# Patient Record
Sex: FEMALE | URBAN - METROPOLITAN AREA
[De-identification: names, ages, dates, MRNs, and addresses within clinical notes are randomized per-mention and may not be internally consistent; named-entity substitution may affect disease eponyms.]

---

## 2020-04-19 ENCOUNTER — INPATIENT (INPATIENT)
Facility: HOSPITAL | Age: 85
LOS: 2 days | Discharge: HOSPICE | DRG: 871 | End: 2020-04-22
Attending: FAMILY MEDICINE | Admitting: FAMILY MEDICINE
Payer: MEDICARE

## 2020-04-19 VITALS — HEIGHT: 60 IN | WEIGHT: 104.94 LBS

## 2020-04-19 DIAGNOSIS — K92.2 GASTROINTESTINAL HEMORRHAGE, UNSPECIFIED: ICD-10-CM

## 2020-04-19 LAB
ALBUMIN SERPL ELPH-MCNC: 2.1 G/DL — LOW (ref 3.3–5)
ALP SERPL-CCNC: 141 U/L — HIGH (ref 40–120)
ALT FLD-CCNC: 34 U/L — SIGNIFICANT CHANGE UP (ref 12–78)
ANION GAP SERPL CALC-SCNC: 12 MMOL/L — SIGNIFICANT CHANGE UP (ref 5–17)
APTT BLD: 30.6 SEC — SIGNIFICANT CHANGE UP (ref 27.5–36.3)
AST SERPL-CCNC: 29 U/L — SIGNIFICANT CHANGE UP (ref 15–37)
BASE EXCESS BLDV CALC-SCNC: -8 MMOL/L — LOW (ref -2–2)
BASOPHILS # BLD AUTO: 0 K/UL — SIGNIFICANT CHANGE UP (ref 0–0.2)
BASOPHILS NFR BLD AUTO: 0 % — SIGNIFICANT CHANGE UP (ref 0–2)
BILIRUB SERPL-MCNC: 1.1 MG/DL — SIGNIFICANT CHANGE UP (ref 0.2–1.2)
BUN SERPL-MCNC: 112 MG/DL — HIGH (ref 7–23)
CALCIUM SERPL-MCNC: 10.2 MG/DL — HIGH (ref 8.5–10.1)
CHLORIDE SERPL-SCNC: 134 MMOL/L — HIGH (ref 96–108)
CO2 SERPL-SCNC: 19 MMOL/L — LOW (ref 22–31)
CREAT SERPL-MCNC: 5.6 MG/DL — HIGH (ref 0.5–1.3)
EOSINOPHIL # BLD AUTO: 0 K/UL — SIGNIFICANT CHANGE UP (ref 0–0.5)
EOSINOPHIL NFR BLD AUTO: 0 % — SIGNIFICANT CHANGE UP (ref 0–6)
FIBRINOGEN PPP-MCNC: >1400 MG/DL — HIGH (ref 320–520)
GLUCOSE SERPL-MCNC: 215 MG/DL — HIGH (ref 70–99)
HCO3 BLDV-SCNC: 18 MMOL/L — LOW (ref 21–29)
HCT VFR BLD CALC: 30.9 % — LOW (ref 34.5–45)
HGB BLD-MCNC: 9.3 G/DL — LOW (ref 11.5–15.5)
INR BLD: 1.39 RATIO — HIGH (ref 0.88–1.16)
LACTATE SERPL-SCNC: 2.8 MMOL/L — HIGH (ref 0.7–2)
LDH SERPL L TO P-CCNC: 366 U/L — HIGH (ref 84–241)
LYMPHOCYTES # BLD AUTO: 1.78 K/UL — SIGNIFICANT CHANGE UP (ref 1–3.3)
LYMPHOCYTES # BLD AUTO: 12 % — LOW (ref 13–44)
MCHC RBC-ENTMCNC: 29.3 PG — SIGNIFICANT CHANGE UP (ref 27–34)
MCHC RBC-ENTMCNC: 30.1 GM/DL — LOW (ref 32–36)
MCV RBC AUTO: 97.5 FL — SIGNIFICANT CHANGE UP (ref 80–100)
MONOCYTES # BLD AUTO: 0.59 K/UL — SIGNIFICANT CHANGE UP (ref 0–0.9)
MONOCYTES NFR BLD AUTO: 4 % — SIGNIFICANT CHANGE UP (ref 2–14)
NEUTROPHILS # BLD AUTO: 12.14 K/UL — HIGH (ref 1.8–7.4)
NEUTROPHILS NFR BLD AUTO: 82 % — HIGH (ref 43–77)
NRBC # BLD: SIGNIFICANT CHANGE UP /100 WBCS (ref 0–0)
PCO2 BLDV: 42 MMHG — SIGNIFICANT CHANGE UP (ref 35–50)
PH BLDV: 7.26 — LOW (ref 7.35–7.45)
PLATELET # BLD AUTO: 260 K/UL — SIGNIFICANT CHANGE UP (ref 150–400)
PO2 BLDV: 42 MMHG — SIGNIFICANT CHANGE UP (ref 25–45)
POTASSIUM SERPL-MCNC: 5 MMOL/L — SIGNIFICANT CHANGE UP (ref 3.5–5.3)
POTASSIUM SERPL-SCNC: 5 MMOL/L — SIGNIFICANT CHANGE UP (ref 3.5–5.3)
PROT SERPL-MCNC: 8.8 GM/DL — HIGH (ref 6–8.3)
PROTHROM AB SERPL-ACNC: 15.6 SEC — HIGH (ref 10–12.9)
RBC # BLD: 3.17 M/UL — LOW (ref 3.8–5.2)
RBC # FLD: 13.8 % — SIGNIFICANT CHANGE UP (ref 10.3–14.5)
SAO2 % BLDV: 59 % — LOW (ref 67–88)
SODIUM SERPL-SCNC: 165 MMOL/L — CRITICAL HIGH (ref 135–145)
WBC # BLD: 14.8 K/UL — HIGH (ref 3.8–10.5)
WBC # FLD AUTO: 14.8 K/UL — HIGH (ref 3.8–10.5)

## 2020-04-19 PROCEDURE — 85730 THROMBOPLASTIN TIME PARTIAL: CPT

## 2020-04-19 PROCEDURE — 74176 CT ABD & PELVIS W/O CONTRAST: CPT | Mod: 26

## 2020-04-19 PROCEDURE — 36415 COLL VENOUS BLD VENIPUNCTURE: CPT

## 2020-04-19 PROCEDURE — 86140 C-REACTIVE PROTEIN: CPT

## 2020-04-19 PROCEDURE — 93010 ELECTROCARDIOGRAM REPORT: CPT

## 2020-04-19 PROCEDURE — 99223 1ST HOSP IP/OBS HIGH 75: CPT

## 2020-04-19 PROCEDURE — 82784 ASSAY IGA/IGD/IGG/IGM EACH: CPT

## 2020-04-19 PROCEDURE — 84100 ASSAY OF PHOSPHORUS: CPT

## 2020-04-19 PROCEDURE — 83735 ASSAY OF MAGNESIUM: CPT

## 2020-04-19 PROCEDURE — 83605 ASSAY OF LACTIC ACID: CPT

## 2020-04-19 PROCEDURE — 84478 ASSAY OF TRIGLYCERIDES: CPT

## 2020-04-19 PROCEDURE — 71045 X-RAY EXAM CHEST 1 VIEW: CPT | Mod: 26

## 2020-04-19 PROCEDURE — 85027 COMPLETE CBC AUTOMATED: CPT

## 2020-04-19 PROCEDURE — 83520 IMMUNOASSAY QUANT NOS NONAB: CPT

## 2020-04-19 PROCEDURE — 82728 ASSAY OF FERRITIN: CPT

## 2020-04-19 PROCEDURE — 85384 FIBRINOGEN ACTIVITY: CPT

## 2020-04-19 PROCEDURE — 83615 LACTATE (LD) (LDH) ENZYME: CPT

## 2020-04-19 PROCEDURE — 80048 BASIC METABOLIC PNL TOTAL CA: CPT

## 2020-04-19 RX ORDER — ONDANSETRON 8 MG/1
4 TABLET, FILM COATED ORAL ONCE
Refills: 0 | Status: COMPLETED | OUTPATIENT
Start: 2020-04-19 | End: 2020-04-19

## 2020-04-19 RX ORDER — ACETAMINOPHEN 500 MG
1000 TABLET ORAL ONCE
Refills: 0 | Status: COMPLETED | OUTPATIENT
Start: 2020-04-19 | End: 2020-04-19

## 2020-04-19 RX ORDER — GABAPENTIN 400 MG/1
300 CAPSULE ORAL THREE TIMES A DAY
Refills: 0 | Status: DISCONTINUED | OUTPATIENT
Start: 2020-04-19 | End: 2020-04-21

## 2020-04-19 RX ORDER — ACETAMINOPHEN 500 MG
650 TABLET ORAL EVERY 4 HOURS
Refills: 0 | Status: DISCONTINUED | OUTPATIENT
Start: 2020-04-19 | End: 2020-04-21

## 2020-04-19 RX ORDER — MEMANTINE HYDROCHLORIDE 10 MG/1
10 TABLET ORAL
Refills: 0 | Status: DISCONTINUED | OUTPATIENT
Start: 2020-04-19 | End: 2020-04-21

## 2020-04-19 RX ORDER — GUAIFENESIN/DEXTROMETHORPHAN 600MG-30MG
10 TABLET, EXTENDED RELEASE 12 HR ORAL EVERY 4 HOURS
Refills: 0 | Status: DISCONTINUED | OUTPATIENT
Start: 2020-04-19 | End: 2020-04-21

## 2020-04-19 RX ORDER — ENOXAPARIN SODIUM 100 MG/ML
40 INJECTION SUBCUTANEOUS DAILY
Refills: 0 | Status: DISCONTINUED | OUTPATIENT
Start: 2020-04-19 | End: 2020-04-19

## 2020-04-19 RX ORDER — OXYCODONE HYDROCHLORIDE 5 MG/1
5 TABLET ORAL EVERY 6 HOURS
Refills: 0 | Status: DISCONTINUED | OUTPATIENT
Start: 2020-04-19 | End: 2020-04-21

## 2020-04-19 RX ORDER — DONEPEZIL HYDROCHLORIDE 10 MG/1
10 TABLET, FILM COATED ORAL AT BEDTIME
Refills: 0 | Status: DISCONTINUED | OUTPATIENT
Start: 2020-04-19 | End: 2020-04-21

## 2020-04-19 RX ORDER — HYDRALAZINE HCL 50 MG
25 TABLET ORAL
Refills: 0 | Status: DISCONTINUED | OUTPATIENT
Start: 2020-04-19 | End: 2020-04-21

## 2020-04-19 RX ORDER — MORPHINE SULFATE 50 MG/1
1 CAPSULE, EXTENDED RELEASE ORAL
Refills: 0 | Status: DISCONTINUED | OUTPATIENT
Start: 2020-04-19 | End: 2020-04-21

## 2020-04-19 RX ORDER — ONDANSETRON 8 MG/1
4 TABLET, FILM COATED ORAL EVERY 6 HOURS
Refills: 0 | Status: DISCONTINUED | OUTPATIENT
Start: 2020-04-19 | End: 2020-04-22

## 2020-04-19 RX ORDER — ASCORBIC ACID 60 MG
500 TABLET,CHEWABLE ORAL DAILY
Refills: 0 | Status: DISCONTINUED | OUTPATIENT
Start: 2020-04-19 | End: 2020-04-21

## 2020-04-19 RX ORDER — ZINC SULFATE TAB 220 MG (50 MG ZINC EQUIVALENT) 220 (50 ZN) MG
220 TAB ORAL AT BEDTIME
Refills: 0 | Status: DISCONTINUED | OUTPATIENT
Start: 2020-04-19 | End: 2020-04-19

## 2020-04-19 RX ORDER — SODIUM CHLORIDE 9 MG/ML
1000 INJECTION, SOLUTION INTRAVENOUS
Refills: 0 | Status: DISCONTINUED | OUTPATIENT
Start: 2020-04-19 | End: 2020-04-20

## 2020-04-19 RX ORDER — HYDROXYCHLOROQUINE SULFATE 200 MG
400 TABLET ORAL EVERY 24 HOURS
Refills: 0 | Status: DISCONTINUED | OUTPATIENT
Start: 2020-04-20 | End: 2020-04-21

## 2020-04-19 RX ORDER — HEPARIN SODIUM 5000 [USP'U]/ML
5000 INJECTION INTRAVENOUS; SUBCUTANEOUS EVERY 12 HOURS
Refills: 0 | Status: DISCONTINUED | OUTPATIENT
Start: 2020-04-19 | End: 2020-04-19

## 2020-04-19 RX ORDER — FOLIC ACID 0.8 MG
1 TABLET ORAL DAILY
Refills: 0 | Status: DISCONTINUED | OUTPATIENT
Start: 2020-04-19 | End: 2020-04-21

## 2020-04-19 RX ORDER — PIPERACILLIN AND TAZOBACTAM 4; .5 G/20ML; G/20ML
3.38 INJECTION, POWDER, LYOPHILIZED, FOR SOLUTION INTRAVENOUS EVERY 12 HOURS
Refills: 0 | Status: DISCONTINUED | OUTPATIENT
Start: 2020-04-20 | End: 2020-04-21

## 2020-04-19 RX ORDER — HYDROMORPHONE HYDROCHLORIDE 2 MG/ML
0.5 INJECTION INTRAMUSCULAR; INTRAVENOUS; SUBCUTANEOUS ONCE
Refills: 0 | Status: DISCONTINUED | OUTPATIENT
Start: 2020-04-19 | End: 2020-04-19

## 2020-04-19 RX ORDER — PIPERACILLIN AND TAZOBACTAM 4; .5 G/20ML; G/20ML
3.38 INJECTION, POWDER, LYOPHILIZED, FOR SOLUTION INTRAVENOUS ONCE
Refills: 0 | Status: COMPLETED | OUTPATIENT
Start: 2020-04-19 | End: 2020-04-19

## 2020-04-19 RX ORDER — SODIUM CHLORIDE 9 MG/ML
500 INJECTION INTRAMUSCULAR; INTRAVENOUS; SUBCUTANEOUS ONCE
Refills: 0 | Status: COMPLETED | OUTPATIENT
Start: 2020-04-19 | End: 2020-04-19

## 2020-04-19 RX ORDER — PANTOPRAZOLE SODIUM 20 MG/1
80 TABLET, DELAYED RELEASE ORAL ONCE
Refills: 0 | Status: COMPLETED | OUTPATIENT
Start: 2020-04-19 | End: 2020-04-19

## 2020-04-19 RX ORDER — DULOXETINE HYDROCHLORIDE 30 MG/1
60 CAPSULE, DELAYED RELEASE ORAL AT BEDTIME
Refills: 0 | Status: DISCONTINUED | OUTPATIENT
Start: 2020-04-19 | End: 2020-04-21

## 2020-04-19 RX ORDER — ROBINUL 0.2 MG/ML
1 INJECTION INTRAMUSCULAR; INTRAVENOUS ONCE
Refills: 0 | Status: COMPLETED | OUTPATIENT
Start: 2020-04-19 | End: 2020-04-19

## 2020-04-19 RX ORDER — HYDROXYCHLOROQUINE SULFATE 200 MG
800 TABLET ORAL EVERY 24 HOURS
Refills: 0 | Status: COMPLETED | OUTPATIENT
Start: 2020-04-19 | End: 2020-04-19

## 2020-04-19 RX ORDER — AMLODIPINE BESYLATE 2.5 MG/1
10 TABLET ORAL DAILY
Refills: 0 | Status: DISCONTINUED | OUTPATIENT
Start: 2020-04-19 | End: 2020-04-21

## 2020-04-19 RX ORDER — DULOXETINE HYDROCHLORIDE 30 MG/1
30 CAPSULE, DELAYED RELEASE ORAL DAILY
Refills: 0 | Status: DISCONTINUED | OUTPATIENT
Start: 2020-04-19 | End: 2020-04-21

## 2020-04-19 RX ORDER — HYDROXYCHLOROQUINE SULFATE 200 MG
TABLET ORAL
Refills: 0 | Status: DISCONTINUED | OUTPATIENT
Start: 2020-04-19 | End: 2020-04-21

## 2020-04-19 RX ADMIN — PIPERACILLIN AND TAZOBACTAM 200 GRAM(S): 4; .5 INJECTION, POWDER, LYOPHILIZED, FOR SOLUTION INTRAVENOUS at 15:32

## 2020-04-19 RX ADMIN — Medication 1000 MILLIGRAM(S): at 16:00

## 2020-04-19 RX ADMIN — PANTOPRAZOLE SODIUM 80 MILLIGRAM(S): 20 TABLET, DELAYED RELEASE ORAL at 15:32

## 2020-04-19 RX ADMIN — SODIUM CHLORIDE 500 MILLILITER(S): 9 INJECTION INTRAMUSCULAR; INTRAVENOUS; SUBCUTANEOUS at 16:51

## 2020-04-19 RX ADMIN — HYDROMORPHONE HYDROCHLORIDE 0.5 MILLIGRAM(S): 2 INJECTION INTRAMUSCULAR; INTRAVENOUS; SUBCUTANEOUS at 19:27

## 2020-04-19 RX ADMIN — PIPERACILLIN AND TAZOBACTAM 3.38 GRAM(S): 4; .5 INJECTION, POWDER, LYOPHILIZED, FOR SOLUTION INTRAVENOUS at 15:50

## 2020-04-19 RX ADMIN — Medication 400 MILLIGRAM(S): at 15:51

## 2020-04-19 RX ADMIN — SODIUM CHLORIDE 500 MILLILITER(S): 9 INJECTION INTRAMUSCULAR; INTRAVENOUS; SUBCUTANEOUS at 17:00

## 2020-04-19 RX ADMIN — ONDANSETRON 4 MILLIGRAM(S): 8 TABLET, FILM COATED ORAL at 15:32

## 2020-04-19 RX ADMIN — SODIUM CHLORIDE 100 MILLILITER(S): 9 INJECTION, SOLUTION INTRAVENOUS at 19:43

## 2020-04-19 RX ADMIN — ROBINUL 1 MILLIGRAM(S): 0.2 INJECTION INTRAMUSCULAR; INTRAVENOUS at 19:26

## 2020-04-19 NOTE — ED PROVIDER NOTE - PMH
Anemia    CKD (chronic kidney disease)    Dementia    DM (diabetes mellitus)    Gastrointestinal hemorrhage    HTN (hypertension)    MDD (major depressive disorder)    Osteoarthritis

## 2020-04-19 NOTE — ED PROVIDER NOTE - CARE PLAN
Principal Discharge DX:	Gastrointestinal hemorrhage  Secondary Diagnosis:	[facility] coronavirus detected Principal Discharge DX:	Gastrointestinal hemorrhage  Secondary Diagnosis:	[facility] coronavirus detected  Secondary Diagnosis:	Bowel obstruction  Secondary Diagnosis:	Respiratory failure with hypoxia

## 2020-04-19 NOTE — ED ADULT NURSE NOTE - OBJECTIVE STATEMENT
Patient presents to the ED BIBEMS from [facility] with reports of GI bleed. Pt is currently altered unable to provide hx, obtained from [facility] who notes the pt was moved to isolation last week after she tested +COVID. They report the pt started vomiting coffee grounds this afternoon. At baseline the pt is able to communicate and over the past few days the pt has become more lethargic and altered as per staff.

## 2020-04-19 NOTE — H&P ADULT - NSHPPHYSICALEXAM_GEN_ALL_CORE
Vital Signs Last 24 Hrs  T(C): 37.4 (19 Apr 2020 17:10), Max: 38.3 (19 Apr 2020 15:08)  T(F): 99.3 (19 Apr 2020 17:10), Max: 101 (19 Apr 2020 15:08)  HR: 104 (19 Apr 2020 18:30) (97 - 116)  BP: 103/49 (19 Apr 2020 18:30) (86/36 - 128/52)  BP(mean): --  RR: 26 (19 Apr 2020 18:30) (20 - 30)  SpO2: 96% (19 Apr 2020 18:30) (85% - 99%)

## 2020-04-19 NOTE — ED PROVIDER NOTE - PROGRESS NOTE DETAILS
Spoke to azam Palacios at [phone] who is pt's brother in law who confirms MOLST form as CPR intubate and FULL CODE. discussed with family member pt is critically ill and lelo fatemehp and that if pt intubate she mat bever be extrubate needs time to process and will disuccess again, for now remains full code. Rory ALAS for ED attending, Dr. Serrano: Spoke to pt's emergency contact Danny Palacios at [phone] who is pt's brother in law who confirms MOLST form as CPR intubate and FULL CODE. Discussed with family member pt is critically ill and may decompensate and that if pt is intubated she may never be extubated. He says he needs time to process this and we will discuss again in a few hours, for now pt remains FULL CODE. Rory ALAS for ED attending, Dr. Serrano: Had extensive conversation with pt's brother in law Delgado regarding findings including significant PNA and worsening renal failure and concern pt will pass away during this admission. Explains intubation and CPR will likely prolong suffering and likely not benefit the pt. He verbalized understanding and agreed to make pt DNR with the goal to make comfort measures at this time and he asked we reach out to his son Christopher at this time which we will do. Rory ALAS for ED attending, Dr. Serrano: Had extensive conversation with pt's brother in law Delgado regarding findings including significant PNA and worsening renal failure and concern pt will pass away during this admission. Explained intubation and CPR will likely prolong suffering and likely not benefit the pt. He verbalized understanding and agreed to make pt DNR with the goal to make comfort measures at this time and he asked we reach out to his son Christopher at this time which we will do.

## 2020-04-19 NOTE — ED PROVIDER NOTE - GASTROINTESTINAL, MLM
Abdomen soft, +pt seems to be guarding with palpation Abdomen soft, though pt seems to be +guarding with palpation

## 2020-04-19 NOTE — ED PROVIDER NOTE - NEUROLOGICAL, MLM
Alert and oriented, no focal deficits, no motor or sensory deficits. Alert and oriented x0, moving extremities spontaneously

## 2020-04-19 NOTE — PHARMACOTHERAPY INTERVENTION NOTE - COMMENTS
Recommended changing from lovenox 40 mg SC QD to heparin 5000 units SC Q12h for DVT prophylaxis because eGFR 6 <15.
Spoke with ED nurse Janett. Per Janett, QTc 453. Appropriate for hydroxychloroquine. Verified order.

## 2020-04-19 NOTE — H&P ADULT - HISTORY OF PRESENT ILLNESS
87 year old female patient with pertinent history of Dementia presented from [facility] after she found to have coffee ground emesis. Patient currently lethargic and unable to provide pertinent narrative. Per transfer documentation patient has been isolating after testing positive for COVID almost a week ago. Patient noted to have coffee ground  emesis prior to ED presentation but was having increasing lethargy for a few days. Patient able to converse at her baseline mental status.      In the ED patient was found to be hypoxic, tachypneic, hypotensive and with elevated BUN/Cr, WBC and sodium.

## 2020-04-19 NOTE — ED PROVIDER NOTE - OBJECTIVE STATEMENT
88 y/o female with PMHx of dementia, anemia, CKD, DM, GI bleed, HTN, MDD, and osteoarthritis presents to the ED BIBEMS from [facility] with reports of GI bleed. Pt is currently altered unable to provide hx, obtained from center. note moved to isloated where she tested + COVID startred vomiting coffee groinds this afternoon. baseline able to communicate past few days more lethargic and altered Pt is known COVID+ as of outpatient swab on 4/9/2020. 88 y/o female with PMHx of dementia, anemia, CKD, DM, GI bleed, HTN, MDD, and osteoarthritis presents to the ED BIBEMS from [facility] with reports of GI bleed. Pt is currently altered unable to provide hx, obtained from [facility] who notes the pt was moved to isolation last week after she tested +COVID. They report the pt started vomiting coffee grounds this afternoon. At baseline the pt is able to communicate and over the past few days the pt has become more lethargic and altered as per staff.

## 2020-04-19 NOTE — ED PROVIDER NOTE - NS_ ATTENDINGSCRIBEDETAILS _ED_A_ED_FT
Charu Serrano M.D.: The history, relevant review of systems, past medical and surgical history, medical decision making, and physical examination was documented by the scribe in my presence and I attest to the accuracy of the documentation .

## 2020-04-19 NOTE — ED ADULT NURSE NOTE - NSIMPLEMENTINTERV_GEN_ALL_ED
Implemented All Fall with Harm Risk Interventions:  [place] to call system. Call bell, personal items and telephone within reach. Instruct patient to call for assistance. Room bathroom lighting operational. Non-slip footwear when patient is off stretcher. Physically safe environment: no spills, clutter or unnecessary equipment. Stretcher in lowest position, wheels locked, appropriate side rails in place. Provide visual cue, wrist band, yellow gown, etc. Monitor gait and stability. Monitor for mental status changes and reorient to person, place, and time. Review medications for side effects contributing to fall risk. Reinforce activity limits and safety measures with patient and family. Provide visual clues: red socks.

## 2020-04-19 NOTE — H&P ADULT - ASSESSMENT
87 year old female patient who presented with acute hypoxemic respiratory failure and coffee ground emesis    -Admit to [facility]      #Acute hypoxemic Respiratory Failure  -DDX:  aspiration pna, COVID, pleural effusion,  -COVID vs aspiration pna likely leading diagnosis  -Will Continue zoysn in light of possible aspiration  -supplemental oxygen  -start hydroxychloroquine if mentation improves  -patient made DNR/DNI in the ED.    # Concern for Upper GI bleed  -pt with notable coffee ground emesis   -on protonix   -may not benefit from invasive procedures/interventions  -GI consult    #SIRS  -aspiration pna a possibility   -IVF hydration  -on zosyn  -follow up blood cx    #Encephalopathy   -etiology unclear  -multiorgan failure likely contributing  -IVF  -neuro checks prn    # Hypernatremia  -will start D5 IVF  -trend bmp    #BREANNA on CKD  -pre-renal etiology likely at play  -no hydronephrosis/stones/ stricture noted on abd/pelvis CT abdomen  -IVF hydration    #Multiorgan failure  -pt with poor prognosis  -PT made DNR/DNI in the ED    #HTN  -on norvasc and hydralazine    #Dementia  - on namenda and aricept    #Advanced Directives  -Pt is DNR/DNI  -MOLST form on file  -pt with poor prognosis  -family notified      -supportive care

## 2020-04-19 NOTE — ED PROVIDER NOTE - CRITICAL CARE PROVIDED
direct patient care (not related to procedure)/documentation/additional history taking/interpretation of diagnostic studies/telephone consultation with the patient's family/conducted a detailed discussion of DNR status

## 2020-04-19 NOTE — ED PROVIDER NOTE - SECONDARY DIAGNOSIS.
[facility] coronavirus detected Bowel obstruction Respiratory failure with hypoxia

## 2020-04-19 NOTE — ED PROVIDER NOTE - CONSTITUTIONAL, MLM
normal... Well appearing, awake, alert, oriented to person, place, time/situation and in mild respiratory distress. A&Ox0, moaning, and in mild respiratory distress.

## 2020-04-19 NOTE — ED PROVIDER NOTE - CLINICAL SUMMARY MEDICAL DECISION MAKING FREE TEXT BOX
87 F COVID+ for last week presenting for GI bleed also found to be hypoxic, febrile, and  tachycardic with ?abd TTP. Concern for possible ischemia vs possible bleeding ulcer. Pt also possibly septic from aspiration PNA in setting of vomiting. However pt also COVID+ thus cannot tolerate significant fluids.  Plan: labs, Protonix, Zofran, cultures, abx CT abd to asses ischema and admission for further assessment. 88 y/o female, COVID+ for the last week, presenting for GI bleed also found to be hypoxic, febrile, and tachycardic with ?abd TTP. Concern for possible ischemia vs possible bleeding ulcer. Pt also possibly septic from aspiration PNA in setting of vomiting. However pt also COVID+ thus cannot tolerate significant fluids.  Plan: labs, Protonix, Zofran, cultures, abx, CT abd to asses ischemia, and admission for further assessment.

## 2020-04-19 NOTE — ED PROVIDER NOTE - ENMT, MLM
Airway patent, Nasal mucosa clear. Mouth with dry mucosa with coffee grounds. Throat has no vesicles, no oropharyngeal exudates and uvula is midline.

## 2020-04-19 NOTE — ED PROVIDER NOTE - RESPIRATORY, MLM
lungs coarse, tachypneic, 85% O2 sat on RA, pt placed on NC lungs +coarse, +tachypneic, 85% O2 sat on RA, pt placed on NC

## 2020-04-20 LAB
ADD ON TEST-SPECIMEN IN LAB: SIGNIFICANT CHANGE UP
APTT BLD: 36.6 SEC — HIGH (ref 27.5–36.3)
CRP SERPL-MCNC: 35.12 MG/DL — HIGH (ref 0–0.4)
FERRITIN SERPL-MCNC: 5167 NG/ML — HIGH (ref 15–150)
HCT VFR BLD CALC: 31.8 % — LOW (ref 34.5–45)
HGB BLD-MCNC: 9.4 G/DL — LOW (ref 11.5–15.5)
IGA FLD-MCNC: 441 MG/DL — SIGNIFICANT CHANGE UP (ref 84–499)
IGG FLD-MCNC: 1566 MG/DL — SIGNIFICANT CHANGE UP (ref 610–1660)
IGM SERPL-MCNC: 139 MG/DL — SIGNIFICANT CHANGE UP (ref 35–242)
KAPPA LC SER QL IFE: 46.37 MG/DL — HIGH (ref 0.33–1.94)
KAPPA/LAMBDA FREE LIGHT CHAIN RATIO, SERUM: 1.54 RATIO — SIGNIFICANT CHANGE UP (ref 0.26–1.65)
LAMBDA LC SER QL IFE: 30.19 MG/DL — HIGH (ref 0.57–2.63)
MAGNESIUM SERPL-MCNC: 3.2 MG/DL — HIGH (ref 1.6–2.6)
MCHC RBC-ENTMCNC: 29.3 PG — SIGNIFICANT CHANGE UP (ref 27–34)
MCHC RBC-ENTMCNC: 29.6 GM/DL — LOW (ref 32–36)
MCV RBC AUTO: 99.1 FL — SIGNIFICANT CHANGE UP (ref 80–100)
NRBC # BLD: 2 /100 WBCS — HIGH (ref 0–0)
PHOSPHATE SERPL-MCNC: 6.8 MG/DL — HIGH (ref 2.5–4.5)
PLATELET # BLD AUTO: 206 K/UL — SIGNIFICANT CHANGE UP (ref 150–400)
RBC # BLD: 3.21 M/UL — LOW (ref 3.8–5.2)
RBC # FLD: 14.4 % — SIGNIFICANT CHANGE UP (ref 10.3–14.5)
TRIGL SERPL-MCNC: 148 MG/DL — SIGNIFICANT CHANGE UP (ref 10–149)
WBC # BLD: 14.99 K/UL — HIGH (ref 3.8–10.5)
WBC # FLD AUTO: 14.99 K/UL — HIGH (ref 3.8–10.5)

## 2020-04-20 PROCEDURE — 99233 SBSQ HOSP IP/OBS HIGH 50: CPT

## 2020-04-20 RX ORDER — SODIUM POLYSTYRENE SULFONATE 4.1 MEQ/G
30 POWDER, FOR SUSPENSION ORAL ONCE
Refills: 0 | Status: COMPLETED | OUTPATIENT
Start: 2020-04-20 | End: 2020-04-20

## 2020-04-20 RX ORDER — SODIUM CHLORIDE 9 MG/ML
1000 INJECTION, SOLUTION INTRAVENOUS
Refills: 0 | Status: DISCONTINUED | OUTPATIENT
Start: 2020-04-20 | End: 2020-04-21

## 2020-04-20 RX ORDER — PANTOPRAZOLE SODIUM 20 MG/1
40 TABLET, DELAYED RELEASE ORAL
Refills: 0 | Status: DISCONTINUED | OUTPATIENT
Start: 2020-04-20 | End: 2020-04-21

## 2020-04-20 RX ADMIN — PIPERACILLIN AND TAZOBACTAM 25 GRAM(S): 4; .5 INJECTION, POWDER, LYOPHILIZED, FOR SOLUTION INTRAVENOUS at 04:30

## 2020-04-20 RX ADMIN — SODIUM CHLORIDE 100 MILLILITER(S): 9 INJECTION, SOLUTION INTRAVENOUS at 23:19

## 2020-04-20 RX ADMIN — MORPHINE SULFATE 1 MILLIGRAM(S): 50 CAPSULE, EXTENDED RELEASE ORAL at 23:19

## 2020-04-20 RX ADMIN — SODIUM CHLORIDE 100 MILLILITER(S): 9 INJECTION, SOLUTION INTRAVENOUS at 15:47

## 2020-04-20 RX ADMIN — SODIUM POLYSTYRENE SULFONATE 30 GRAM(S): 4.1 POWDER, FOR SUSPENSION ORAL at 16:18

## 2020-04-20 RX ADMIN — SODIUM CHLORIDE 500 MILLILITER(S): 9 INJECTION, SOLUTION INTRAVENOUS at 11:49

## 2020-04-20 RX ADMIN — PIPERACILLIN AND TAZOBACTAM 25 GRAM(S): 4; .5 INJECTION, POWDER, LYOPHILIZED, FOR SOLUTION INTRAVENOUS at 17:51

## 2020-04-20 NOTE — CONSULT NOTE ADULT - ASSESSMENT
Partial obstruction due to complex ventral hernia containing small and large bowel loops. Pt DNR with worsening renal failure. Does not appear to be a surgical candidate unfortunately. Will evaluate further if condition warrants.

## 2020-04-20 NOTE — CONSULT NOTE ADULT - ASSESSMENT
Pneumonia-covid pos  Hx CKD and worsening renal function  Altered mental status  Coffee ground emesis before admission            protonix 40 mg iv daily  Ventral hernia with partial obstruction            may obtain surgical consult but pt next of kin according to hospitalist note does not wish any invasive procedures secondary to poor status of pt and does             not wish any endoscopic procedures.             if decision is changed , please reconsult    monitor hgb daily  antiemetic prn  RECOMMEND renal consult

## 2020-04-20 NOTE — PROGRESS NOTE ADULT - SUBJECTIVE AND OBJECTIVE BOX
c/c: Vomiting/abd pain, covid+    HPI:  87F, pmh of dementia, anemia, CKD, DM2, GI bleed, HTN, and osteoarthritis recently tested positive for covid about a week prior to admission who was sent from [facility] with coffee ground emesis and worsening lethargy. She was evaluated in ED and found to have low grade bowel obstruction on CT a/p. She was admitted with acute hypoxic respiratory failure d/t covid pna, BREANNA with creatinine 5.6, NA+ 165, lactic acidosis.     4/20: pt seen and examined. Chart/labs/imaging reviewed as documented by myself above. Comfortable. minimally verbal. Doesn't answer questions appropriately.    ROS: unable to obtain d/t dementia.     Vital Signs Last 24 Hrs  T(C): 36.5 (20 Apr 2020 10:24), Max: 38.3 (19 Apr 2020 15:08)  T(F): 97.7 (20 Apr 2020 10:24), Max: 101 (19 Apr 2020 15:08)  HR: 99 (20 Apr 2020 10:24) (97 - 116)  BP: 100/50 (20 Apr 2020 10:24) (86/36 - 128/52)  RR: 18 (20 Apr 2020 10:24) (18 - 30)  SpO2: 95% (20 Apr 2020 10:24) (85% - 100%)    PHYSICAL EXAM:    GENERAL: Lethargic but arousable Comfortable, no acute distress  HEAD:  Atraumatic, Normocephalic  EYES: EOMI, PERRLA  HEENT: very dry  mucous membranes  NECK: Supple, No JVD  NERVOUS SYSTEM:  doesn't follow commands.   CHEST/LUNG: Clear to auscultation bilaterally  HEART: Regular rate and rhythm; No murmurs, rubs, or gallops  ABDOMEN: Soft, lower abd tenderness, no guarding/rigidity, +BS  GENITOURINARY- Voiding, no palpable bladder  EXTREMITIES:  No clubbing, cyanosis, or edema  MUSCULOSKELETAL- No muscle tenderness, No joint tenderness  SKIN-no rash      LABS:                        9.4    14.99 )-----------( 206      ( 20 Apr 2020 06:39 )             31.8     04-20    163<HH>  |  137<H>  |  133<H>  ----------------------------<  210<H>  6.1<H>   |  17<L>  |  7.29<H>    Ca    9.6      20 Apr 2020 12:16  Phos  6.6     04-20  Mg     3.1     04-20    TPro  8.8<H>  /  Alb  2.1<L>  /  TBili  1.1  /  DBili  x   /  AST  29  /  ALT  34  /  AlkPhos  141<H>  04-19    PT/INR - ( 19 Apr 2020 15:20 )   PT: 15.6 sec;   INR: 1.39 ratio         PTT - ( 20 Apr 2020 06:39 )  PTT:36.6 sec    MEDICATIONS  (STANDING):  amLODIPine   Tablet 10 milliGRAM(s) Oral daily  ascorbic acid 500 milliGRAM(s) Oral daily  dextrose 5% + sodium chloride 0.45%. 1000 milliLiter(s) (500 mL/Hr) IV Continuous <Continuous>  dextrose 5% + sodium chloride 0.45%. 1000 milliLiter(s) (100 mL/Hr) IV Continuous <Continuous>  donepezil 10 milliGRAM(s) Oral at bedtime  DULoxetine 60 milliGRAM(s) Oral at bedtime  DULoxetine 30 milliGRAM(s) Oral daily  folic acid 1 milliGRAM(s) Oral daily  gabapentin 300 milliGRAM(s) Oral three times a day  guaifenesin/dextromethorphan  Syrup 10 milliLiter(s) Oral every 4 hours  hydrALAZINE 25 milliGRAM(s) Oral two times a day  hydroxychloroquine   Oral   hydroxychloroquine 400 milliGRAM(s) Oral every 24 hours  memantine 10 milliGRAM(s) Oral two times a day  oxyCODONE    IR 5 milliGRAM(s) Oral every 6 hours  pantoprazole    Tablet 40 milliGRAM(s) Oral two times a day  piperacillin/tazobactam IVPB.. 3.375 Gram(s) IV Intermittent every 12 hours    MEDICATIONS  (PRN):  acetaminophen   Tablet .. 650 milliGRAM(s) Oral every 4 hours PRN Temp greater or equal to 38.5C (101.3F)  morphine  - Injectable 1 milliGRAM(s) IV Push every 3 hours PRN Severe Pain (7 - 10)  ondansetron Injectable 4 milliGRAM(s) IV Push every 6 hours PRN Nausea and/or Vomiting      ASSESSMENT AND PLAN:  87f, PMH as above a/w:    1. Early bowel obstruction/Coffee ground emesis:  -NPO  -IVF  -pain control  -likely not a surgical candidate    2. Covid +pna/acute hypoxic resp failure  -o2 via nasal cannula 5L.  -supportive care, unable to take po meds.    3. BREANNA/Hyperkalemia/Hypernatremia:  -changed ivf from d5ns to d51/2 ns  -bolus as ordered.   -kayexalate enema   -would not want HD per d/w brother in law Danny    4. Dementia:  -supportive care    5. HTN:  -unable to take po meds  -bp borderline.     6. Adv Directives/GOC:  d/w emergency contact/surrogate: Danny Palacios [phone], prognosis poor, condition guarded  He is aware, and would like comfort measures moving forward, no aggressive treatments  discussed option of inpatient hospice and he will think about it.   DNR/DNI confirmed

## 2020-04-20 NOTE — CONSULT NOTE ADULT - SUBJECTIVE AND OBJECTIVE BOX
Chart and CT reviewed. Full consult to follow.    87 year old female patient with pertinent history of Dementia  and c k d who presented with coffee ground emesis and increasing lethargy  worsening renal function  covid pneumonia and altered mental status    CT scanshows complex ventral hernia containing small and large bowel loops

## 2020-04-21 LAB
ANION GAP SERPL CALC-SCNC: 11 MMOL/L — SIGNIFICANT CHANGE UP (ref 5–17)
BUN SERPL-MCNC: 150 MG/DL — HIGH (ref 7–23)
CALCIUM SERPL-MCNC: 10.3 MG/DL — HIGH (ref 8.5–10.1)
CHLORIDE SERPL-SCNC: 136 MMOL/L — HIGH (ref 96–108)
CO2 SERPL-SCNC: 14 MMOL/L — LOW (ref 22–31)
CREAT SERPL-MCNC: 9.12 MG/DL — HIGH (ref 0.5–1.3)
CRP SERPL-MCNC: 57.56 MG/DL — HIGH (ref 0–0.4)
FERRITIN SERPL-MCNC: 3102 NG/ML — HIGH (ref 15–150)
GLUCOSE SERPL-MCNC: 188 MG/DL — HIGH (ref 70–99)
LDH SERPL L TO P-CCNC: 606 U/L — HIGH (ref 84–241)
POTASSIUM SERPL-MCNC: 6.7 MMOL/L — CRITICAL HIGH (ref 3.5–5.3)
POTASSIUM SERPL-SCNC: 6.7 MMOL/L — CRITICAL HIGH (ref 3.5–5.3)
SODIUM SERPL-SCNC: 161 MMOL/L — CRITICAL HIGH (ref 135–145)

## 2020-04-21 PROCEDURE — 99233 SBSQ HOSP IP/OBS HIGH 50: CPT

## 2020-04-21 RX ORDER — HYDROMORPHONE HYDROCHLORIDE 2 MG/ML
0.3 INJECTION INTRAMUSCULAR; INTRAVENOUS; SUBCUTANEOUS
Refills: 0 | Status: DISCONTINUED | OUTPATIENT
Start: 2020-04-21 | End: 2020-04-22

## 2020-04-21 RX ADMIN — HYDROMORPHONE HYDROCHLORIDE 0.3 MILLIGRAM(S): 2 INJECTION INTRAMUSCULAR; INTRAVENOUS; SUBCUTANEOUS at 16:57

## 2020-04-21 RX ADMIN — PIPERACILLIN AND TAZOBACTAM 25 GRAM(S): 4; .5 INJECTION, POWDER, LYOPHILIZED, FOR SOLUTION INTRAVENOUS at 05:52

## 2020-04-21 RX ADMIN — MORPHINE SULFATE 1 MILLIGRAM(S): 50 CAPSULE, EXTENDED RELEASE ORAL at 06:11

## 2020-04-21 NOTE — PROGRESS NOTE ADULT - PROVIDER SPECIALTY LIST ADULT
2019    DUNG Newell MD  [facility]  [address]    Patient: Wilbert Temple   YOB: 2004   Date of Visit: 2019     Encounter Diagnosis     ICD-10-CM    1  Left-sided thoracic back pain, unspecified chronicity M54 6        Dear Dr Rocha Chain:    Please review the attached Plan of Care from Rylee Jones's recent visit  Please verify that you agree therapy should continue by signing the attached document and sending it back to our office  If you have any questions or concerns, please don't hesitate to call  Sincerely,    Severo Moreno PT      Referring Provider:      I certify that I have read the below Plan of Care and certify the need for these services furnished under this plan of treatment while under my care  Tiffanie Cash MD  [address]: [phone]          PT Evaluation     Today's date: 2019  Patient name: Wilbert Temple  : 2004  MRN: [id number]  Referring provider: Peter Williamson MD  Dx:   Encounter Diagnosis     ICD-10-CM    1  Left-sided thoracic back pain, unspecified chronicity M54 6                   Assessment  Assessment details: Wilbert Temple presents with signs and symptoms consistent with referring diagnosis  she demonstrates pain, decreased strength, decreased ROM, decreased joint mobility, postural  dysfunction and altered breathing mechanics that may also be contributing to her current symptoms  She also demonstrates some imbalance in her strength of the shoulder stabilizers and decreased control of upward rotation of the left shoulder which may also be contributing to her L sided symptoms in the thoracic spine  Recommend that she may benefit from PT in this setting in order to address the aforementioned impairments and restore Her ability to perform all prior activities without pain or difficulty   Should you have any questions regarding this Hospitalist patient's care, please contact [phone]    Thank you for your referral      Impairments: abnormal muscle tone, abnormal or restricted ROM, abnormal movement, activity intolerance, lacks appropriate home exercise program, pain with function, poor posture  and poor body mechanics  Other impairment: Altered breathing patterns  Understanding of Dx/Px/POC: good   Prognosis: good    Goals  Short Term Goals to be completed within 3 weeks  Patient to demo good seated and standing posture without cues from PT 50% of the time  Patient will tolerate further strength testing capacity of L and R shoulder to identify strength imbalances  Patient able to improve ROM of L thoracic rotation to WNL's  Patient able to demo correct transfer techniques with good body mechanics and min cues from PT  Patient able to demo performance of proper diaphragmatic breathing pattern with minimal difficulty in supine    Long Term Goals to be completed within 6 weeks  Patient to demo good seated and standing posture without cues from PT 50% of the time  Patient able to demo correct transfer techniques with good body mechanics and without cues from PT  Patient to be independent with HEP  Patient's FOTO score to improve to 84/100      Plan  Patient would benefit from: skilled physical therapy  Planned modality interventions: thermotherapy: hydrocollator packs and cryotherapy  Planned therapy interventions: abdominal trunk stabilization, activity modification, joint mobilization, manual therapy, neuromuscular re-education, patient education, postural training, breathing training, body mechanics training, behavior modification, strengthening, therapeutic exercise, flexibility and home exercise program  Frequency: 2x week  Duration in visits: 12  Duration in weeks: 6  Plan of Care beginning date: 2/28/2019  Plan of Care expiration date: 4/12/2019  Treatment plan discussed with: patient and family        Subjective Evaluation    History of Present Illness  Date of onset: 2018  Mechanism of injury: Patient reports that she had onset of L sided thoracic pain sometime in the end of December when she was sitting in the car and went to take a deep breath in and felt a sharp pain into her L scapular and thoracic area  She started to see a chiropractor who was able to manipulate it and it would feel good for a very short time (1 hour) then revert back to her original pain  The pain bothers her with deep inhalation, which affects her swimming in addition to just being able to tolerate breathing in everyday activities  She went to see her primary care MD who suggested she try some Alleve for a couple days after PE ruled out  She occasionally gets the L shoulder pain she had previously and does her exercises program she was given  Currently, her L scapular pain is worse when she slouches as well  Quality of life: good    Pain  Current pain rating: 3  At best pain ratin  At worst pain ratin  Pain location: L thoracic, scapular area  Quality: sharp and tight  Relieving factors: rest  Exacerbated by: breathing  Progression: no change (Since December)    Social Support  Lives with: parents    Hand dominance: right  Exercise comments: Swimming competitively 6 days per week  Diagnostic Tests  No diagnostic tests performed  Treatments  Previous treatment: medication and chiropractic  Current treatment: chiropractic  Current treatment comments: 1x/wk at [facility]  Patient Goals  Patient goals for therapy: decreased pain, increased strength and return to sport/leisure activities  Patient's goals regarding treatment: Has Championships next weekend, [place] o[place]ics , then done swimming  Objective     Static Posture     Scapulae  Left winging and left downwardly rotated  Thoracic Spine  Rotated right      Comments  (+)excessive Accessory breathing     Postural Observations  Seated posture: poor  Standing posture: poor  Correction of posture: makes symptoms better        Active Range of Motion   Cervical/Thoracic Spine       Cervical  Subcranial protraction:  WFL   Subcranial retraction:  WFL   Flexion:  WFL  Extension:  WFL  Left lateral flexion:  WFL  Right lateral flexion:  WFL  Left rotation:  WFL  Right rotation:  [facility]    Thoracic    Flexion:  Restriction level: minimal  Extension:  WFL Restriction level: moderate  Left lateral flexion:  with pain Restriction level: minimal  Right lateral flexion:  with pain Restriction level: minimal  Left rotation:  Restriction level: moderate  Right rotation:  [facility]    Strength/Myotome Testing   Cervical Spine     Right   Normal strength    Left Shoulder     Planes of Motion   Flexion: 5   Extension: 5   Abduction: 5   External rotation at 0°:  4   Internal rotation at 0°:  4+     Isolated Muscles   Lower trapezius: 4   Serratus anterior: 4-   Upper trapezius: 5     Left Elbow   Flexion: 5  Extension: 5    Tests       Thoracic   Positive chest expansion test (expands on R side greater than L)             Precautions: Standard    Daily Treatment Diary     Manual  2/28            MET MET to correct R thoracic rotation            Flexibility             Joint mobilization thoracic extension in sitting            MRE             STM                 Exercise Diary              Open books 10 to L only            Diaphragmatic breathing 20x            Posture Re ed 5 minutes                                                                                                                                                                                                                                             Modalities

## 2020-04-21 NOTE — CDI QUERY NOTE - NSCDIOTHERTXTBX_GEN_ALL_CORE_HH
Documentation on chart of SIRS.    Pt. admitted with COVID and PNA.     WBC 14 - C reactive 35.12 - Lactate 3.7 - 101F -  - RR 30    piperacillin/tazobactam IVPB.  sodium chloride 0.9% Bolus   500 mL/Hr IV Bolus (04-19-20 @ 16:51)    Please clarify a diagnosis based on the above criteria:     STATUS:  Sepsis ruled in and was present on admission and due to COVID-19  Sepsis is resolving and was present on admission and due to COVID-19  Sepsis ruled out  Early Sepsis POA, resolved? and was present on admission and due to COVID-19

## 2020-04-21 NOTE — CDI QUERY NOTE - NSCDIOTHERTXTBX2_GEN_ALL_CORE_FT
Documentation on COVID-19 and Pneumonia.    87 year old female patient with pertinent history of Dementia presented from [facility] [facility] after she found to have coffee ground emesis. Patient currently lethargic and unable to provide pertinent narrative. Per transfer documentation patient has been isolating after testing positive for COVID almost a week ago. Patient noted to have coffee ground  emesis prior to ED presentation but was having increasing lethargy for a few days. Patient able to converse at her baseline mental status.      Pt. has Hypernatremia and BREANNA.    Documentation on chart of Encephalopathy.    Please clarify the type of Encephalopathy:  A) Metabolic Encephalopathy  B) Toxic Metabolic Encephalopathy  C) Unable to determine  D) Other ( Please specify condition)

## 2020-04-21 NOTE — PROGRESS NOTE ADULT - SUBJECTIVE AND OBJECTIVE BOX
c/c: Vomiting/abd pain, covid+    HPI:  87F, pmh of dementia, anemia, CKD, DM2, GI bleed, HTN, and osteoarthritis recently tested positive for covid about a week prior to admission who was sent from [facility] with coffee ground emesis and worsening lethargy. She was evaluated in ED and found to have low grade bowel obstruction on CT a/p. She was admitted with acute hypoxic respiratory failure d/t covid pna, BREANNA with creatinine 5.6, NA+ 165, lactic acidosis.   Her renal function worsened while here with worsening potassium. Her overall condition was d/w family, given her poor prognosis and unlikelihood to survive, they have opted for comfort measures.     4/21: pt seen and examined this am. Minimally responsive. appears comfortable.    ROS: unable to obtain d/t dementia/lethargy    Vital Signs Last 24 Hrs  T(C): 36.8 (21 Apr 2020 05:49), Max: 36.8 (21 Apr 2020 05:49)  T(F): 98.3 (21 Apr 2020 05:49), Max: 98.3 (21 Apr 2020 05:49)  HR: 102 (21 Apr 2020 05:49) (102 - 102)  BP: 128/56 (21 Apr 2020 05:49) (101/51 - 128/56)  RR: 18 (21 Apr 2020 05:49) (17 - 18)  SpO2: 97% (21 Apr 2020 05:49) (92% - 97%)    PHYSICAL EXAM:    GENERAL: Lethargic not following commands  HEAD:  Atraumatic, Normocephalic  EYES: EOMI, PERRLA  HEENT: very dry  mucous membranes  NECK: Supple, No JVD  NERVOUS SYSTEM:  doesn't follow commands.   CHEST/LUNG: Clear to auscultation bilaterally  HEART: Regular rate and rhythm; No murmurs, rubs, or gallops  ABDOMEN: Soft, lower abd tenderness, no guarding/rigidity, +BS  GENITOURINARY- Voiding, no palpable bladder  EXTREMITIES:  No clubbing, cyanosis, or edema  MUSCULOSKELETAL- No muscle tenderness, No joint tenderness  SKIN-no rash    LABS:                        9.4    14.99 )-----------( 206      ( 20 Apr 2020 06:39 )             31.8     04-21    161<HH>  |  136<H>  |  150<H>  ----------------------------<  188<H>  6.7<HH>   |  14<L>  |  9.12<H>    Ca    10.3<H>      21 Apr 2020 08:42  Phos  6.6     04-20  Mg     3.1     04-20    TPro  8.8<H>  /  Alb  2.1<L>  /  TBili  1.1  /  DBili  x   /  AST  29  /  ALT  34  /  AlkPhos  141<H>  04-19    PT/INR - ( 19 Apr 2020 15:20 )   PT: 15.6 sec;   INR: 1.39 ratio         PTT - ( 20 Apr 2020 06:39 )  PTT:36.6 sec    MEDICATIONS  (STANDING):  amLODIPine   Tablet 10 milliGRAM(s) Oral daily  ascorbic acid 500 milliGRAM(s) Oral daily  dextrose 5% + sodium chloride 0.45%. 1000 milliLiter(s) (500 mL/Hr) IV Continuous <Continuous>  dextrose 5% + sodium chloride 0.45%. 1000 milliLiter(s) (100 mL/Hr) IV Continuous <Continuous>  donepezil 10 milliGRAM(s) Oral at bedtime  DULoxetine 60 milliGRAM(s) Oral at bedtime  DULoxetine 30 milliGRAM(s) Oral daily  folic acid 1 milliGRAM(s) Oral daily  gabapentin 300 milliGRAM(s) Oral three times a day  guaifenesin/dextromethorphan  Syrup 10 milliLiter(s) Oral every 4 hours  hydrALAZINE 25 milliGRAM(s) Oral two times a day  hydroxychloroquine   Oral   hydroxychloroquine 400 milliGRAM(s) Oral every 24 hours  memantine 10 milliGRAM(s) Oral two times a day  oxyCODONE    IR 5 milliGRAM(s) Oral every 6 hours  pantoprazole    Tablet 40 milliGRAM(s) Oral two times a day  piperacillin/tazobactam IVPB.. 3.375 Gram(s) IV Intermittent every 12 hours    MEDICATIONS  (PRN):  acetaminophen   Tablet .. 650 milliGRAM(s) Oral every 4 hours PRN Temp greater or equal to 38.5C (101.3F)  morphine  - Injectable 1 milliGRAM(s) IV Push every 3 hours PRN Severe Pain (7 - 10)  ondansetron Injectable 4 milliGRAM(s) IV Push every 6 hours PRN Nausea and/or Vomiting      ASSESSMENT AND PLAN:  87f, PMH as above a/w:    1. Early bowel obstruction/Coffee ground emesis:  -NPO  -IVF  -pain control  -not a surgical candidate.    2. Covid +pna/acute hypoxic resp failure  -o2 via nasal cannula 5L.  -supportive care, unable to take po meds.    3. BREANNA/Hyperkalemia/Hypernatremia:  -worsening despite ivf.     4. Dementia:  -supportive care    5. HTN:  -unable to take po meds  -bp borderline.     6. Adv Directives/GOC:  d/w emergency contact/surrogate: Danny Ruedakham [phone] again today  prognosis poor, condition guarded  He is aware, and would like comfort measures , no aggressive treatments  DNR/DNI confirmed   comfort measures only   will start dilaudid prn pain.   will hold off on transfer to hospice given imminent death.

## 2020-04-21 NOTE — CONSULT NOTE ADULT - ASSESSMENT
86 yo female, DNR, covid +, with partial obstruction of small bowel secondary to non reducible ventral hernia. Pt is not a surgical candidate, and family wants comfort measures only. Thank you.

## 2020-04-21 NOTE — CONSULT NOTE ADULT - SUBJECTIVE AND OBJECTIVE BOX
87 year old female patient with pertinent history of Dementia presented from [place] [facility] after she found to have coffee ground emesis. She is lethargic and unable to provide pertinent narrative. Per transfer documentation patient has been isolating after testing positive for COVID almost a week ago. Patient noted to have coffee ground  emesis prior to ED presentation but was having increasing lethargy for a few days. Patient able to converse at her baseline mental status.   She was evaluated in ED and found to have low grade bowel obstruction on CT a/p. She was admitted with acute hypoxic respiratory failure d/t covid pna, BREANNA with creatinine 5.6, NA+ 165, lactic acidosis.       PMH- dementia, covid    PE- restless, confused      Abd- large non reducible ventral hernia. Tender to palp.

## 2020-04-22 ENCOUNTER — TRANSCRIPTION ENCOUNTER (OUTPATIENT)
Age: 85
End: 2020-04-22

## 2020-04-22 VITALS
DIASTOLIC BLOOD PRESSURE: 60 MMHG | TEMPERATURE: 98 F | RESPIRATION RATE: 20 BRPM | HEART RATE: 100 BPM | SYSTOLIC BLOOD PRESSURE: 133 MMHG | OXYGEN SATURATION: 90 %

## 2020-04-22 LAB
CRP SERPL-MCNC: 54.35 MG/DL — HIGH (ref 0–0.4)
FERRITIN SERPL-MCNC: 2469 NG/ML — HIGH (ref 15–150)
FIBRINOGEN PPP-MCNC: >1400 MG/DL — HIGH (ref 320–520)
LDH SERPL L TO P-CCNC: 716 U/L — HIGH (ref 50–242)

## 2020-04-22 PROCEDURE — 99239 HOSP IP/OBS DSCHRG MGMT >30: CPT

## 2020-04-22 RX ORDER — FOLIC ACID 0.8 MG
1 TABLET ORAL
Qty: 0 | Refills: 0 | DISCHARGE

## 2020-04-22 RX ORDER — FERROUS SULFATE 325(65) MG
1 TABLET ORAL
Qty: 0 | Refills: 0 | DISCHARGE

## 2020-04-22 RX ORDER — HYDRALAZINE HCL 50 MG
1 TABLET ORAL
Qty: 0 | Refills: 0 | DISCHARGE

## 2020-04-22 RX ORDER — GABAPENTIN 400 MG/1
1 CAPSULE ORAL
Qty: 0 | Refills: 0 | DISCHARGE

## 2020-04-22 RX ORDER — HYDROMORPHONE HYDROCHLORIDE 2 MG/ML
0.5 INJECTION INTRAMUSCULAR; INTRAVENOUS; SUBCUTANEOUS
Qty: 0 | Refills: 0 | DISCHARGE
Start: 2020-04-22

## 2020-04-22 RX ORDER — OXYCODONE HYDROCHLORIDE 5 MG/1
1 TABLET ORAL
Qty: 0 | Refills: 0 | DISCHARGE

## 2020-04-22 RX ORDER — ACETAMINOPHEN 500 MG
0 TABLET ORAL
Qty: 0 | Refills: 0 | DISCHARGE

## 2020-04-22 RX ORDER — MEMANTINE HYDROCHLORIDE 10 MG/1
1 TABLET ORAL
Qty: 0 | Refills: 0 | DISCHARGE

## 2020-04-22 RX ORDER — DOCUSATE SODIUM 100 MG
0 CAPSULE ORAL
Qty: 0 | Refills: 0 | DISCHARGE

## 2020-04-22 RX ORDER — ZINC SULFATE TAB 220 MG (50 MG ZINC EQUIVALENT) 220 (50 ZN) MG
1 TAB ORAL
Qty: 0 | Refills: 0 | DISCHARGE

## 2020-04-22 RX ORDER — ASPIRIN/CALCIUM CARB/MAGNESIUM 324 MG
1 TABLET ORAL
Qty: 0 | Refills: 0 | DISCHARGE

## 2020-04-22 RX ORDER — DULOXETINE HYDROCHLORIDE 30 MG/1
1 CAPSULE, DELAYED RELEASE ORAL
Qty: 0 | Refills: 0 | DISCHARGE

## 2020-04-22 RX ORDER — FAMOTIDINE 10 MG/ML
1 INJECTION INTRAVENOUS
Qty: 0 | Refills: 0 | DISCHARGE

## 2020-04-22 RX ORDER — ONDANSETRON 8 MG/1
4 TABLET, FILM COATED ORAL
Qty: 0 | Refills: 0 | DISCHARGE
Start: 2020-04-22

## 2020-04-22 RX ORDER — DONEPEZIL HYDROCHLORIDE 10 MG/1
1 TABLET, FILM COATED ORAL
Qty: 0 | Refills: 0 | DISCHARGE

## 2020-04-22 RX ORDER — AMLODIPINE BESYLATE 2.5 MG/1
1 TABLET ORAL
Qty: 0 | Refills: 0 | DISCHARGE

## 2020-04-22 RX ORDER — ASCORBIC ACID 60 MG
1 TABLET,CHEWABLE ORAL
Qty: 0 | Refills: 0 | DISCHARGE

## 2020-04-22 RX ORDER — HYDROMORPHONE HYDROCHLORIDE 2 MG/ML
0.5 INJECTION INTRAMUSCULAR; INTRAVENOUS; SUBCUTANEOUS
Refills: 0 | Status: DISCONTINUED | OUTPATIENT
Start: 2020-04-22 | End: 2020-04-22

## 2020-04-22 RX ADMIN — HYDROMORPHONE HYDROCHLORIDE 0.3 MILLIGRAM(S): 2 INJECTION INTRAMUSCULAR; INTRAVENOUS; SUBCUTANEOUS at 05:15

## 2020-04-22 RX ADMIN — Medication 0.5 MILLIGRAM(S): at 10:30

## 2020-04-22 RX ADMIN — HYDROMORPHONE HYDROCHLORIDE 0.3 MILLIGRAM(S): 2 INJECTION INTRAMUSCULAR; INTRAVENOUS; SUBCUTANEOUS at 08:31

## 2020-04-22 NOTE — CDI QUERY NOTE - NSCDIOTHERTXTBX2_GEN_ALL_CORE_FT
Documentation on COVID-19 and Pneumonia.    87 year old female patient with pertinent history of Dementia presented from [facility] after she found to have coffee ground emesis. Patient currently lethargic and unable to provide pertinent narrative. Per transfer documentation patient has been isolating after testing positive for COVID almost a week ago. Patient noted to have coffee ground  emesis prior to ED presentation but was having increasing lethargy for a few days. Patient able to converse at her baseline mental status.      Pt. has Hypernatremia and BREANNA.    Documentation on chart of Encephalopathy.    Please clarify the type of Encephalopathy:  A) Metabolic Encephalopathy  B) Toxic Metabolic Encephalopathy  C) Unable to determine  D) Other ( Please specify condition)

## 2020-04-22 NOTE — DISCHARGE NOTE PROVIDER - NSDCMRMEDTOKEN_GEN_ALL_CORE_FT
HYDROmorphone: 0.5 milligram(s) intravenous every 3 hours, As Needed for resp distress/pain  LORazepam: 0.5 milligram(s) intravenous every 6 hours, As Needed for anxiety/agitation  ondansetron 2 mg/mL injectable solution: 4 milligram(s) intravenous every 6 hours, As Needed for nausea/vomiting

## 2020-04-22 NOTE — DISCHARGE NOTE PROVIDER - NSDCCPCAREPLAN_GEN_ALL_CORE_FT
PRINCIPAL DISCHARGE DIAGNOSIS  Diagnosis: Acute renal failure syndrome  Assessment and Plan of Treatment:       SECONDARY DISCHARGE DIAGNOSES  Diagnosis: Respiratory failure with hypoxia  Assessment and Plan of Treatment:     Diagnosis: Bowel obstruction  Assessment and Plan of Treatment:     Diagnosis: [place] coronavirus detected  Assessment and Plan of Treatment:

## 2020-04-22 NOTE — DISCHARGE NOTE PROVIDER - HOSPITAL COURSE
87F, pmh of dementia, anemia, CKD, DM2, GI bleed, HTN, and osteoarthritis recently tested positive for covid about a week prior to admission who was sent from [facility] with coffee ground emesis and worsening lethargy. She was evaluated in ED and found to have low grade bowel obstruction on CT a/p. She was admitted with acute hypoxic respiratory failure d/t covid pna, BREANNA with creatinine 5.6, NA+ 165, lactic acidosis.     Her renal function worsened while here with worsening potassium. Her overall condition was d/w family, given her poor prognosis and unlikelihood to survive, they have opted for comfort measures.     She will be transferred to inpatient hospice today.        Vital Signs Last 24 Hrs    T(C): 36.8 (21 Apr 2020 22:24), Max: 36.8 (21 Apr 2020 22:24)    T(F): 98.2 (21 Apr 2020 22:24), Max: 98.2 (21 Apr 2020 22:24)    HR: 58 (22 Apr 2020 09:30) (58 - 109)    BP: 132/67 (22 Apr 2020 05:00) (132/67 - 137/60)    RR: 28 (22 Apr 2020 09:30) (18 - 28)    SpO2: 80% (22 Apr 2020 09:30) (80% - 100%)            PHYSICAL EXAM:    GENERAL: lethargic, intermittently flailing arms.     HEAD:  Normocephalic, atraumatic    EYES: EOMI, PERRLA    HEENT: Moist mucous membranes    NECK: Supple, No JVD    NERVOUS SYSTEM: lethargic, minimally arousable.    CHEST/LUNG: decrased bs b/l    HEART: Regular rate and rhythm    ABDOMEN: Soft, diffuse tenderness    GENITOURINARY: Voiding, no palpable bladder    EXTREMITIES:   No clubbing, cyanosis, or edema    MUSCULOSKELETAL- No muscle tenderness, no joint tenderness    SKIN-no rash        LABS:        04-21        161<HH>  |  136<H>  |  150<H>    ----------------------------<  188<H>    6.7<HH>   |  14<L>  |  9.12<H>        Ca    10.3<H>      21 Apr 2020 08:42    Phos  6.6     04-20    Mg     3.1     04-20         CT Abdomen and Pelvis No Cont (04.19.20 @ 17:03)     IMPRESSION:     1.  Unremarkable limited by lack of IV contrast, no CT evidence of bowel ischemia.    2.  Ventral abdominal wall hernia containing transverse colon with low-grade colonic obstruction due to the hernia.    3.  Ventral abdominal wall hernia also containing small bowel without evidence of obstruction.    4.  Bilateral COVID 19 pneumonia.        FINAL DIAGNOSIS:    1. Early bowel obstruction/Coffee ground emesis    2. Covid +pna/acute hypoxic resp failure    3. Metabolic encephalopathy    4. BREANNA/Hyperkalemia/Hypernatremia    5. Dementia    6. HTN            time taken for dc 50 min    d/w MD @ [facility].    [place] to be contacted. 87F, pmh of dementia, anemia, CKD, DM2, GI bleed, HTN, and osteoarthritis recently tested positive for covid about a week prior to admission who was sent from [facility] with coffee ground emesis and worsening lethargy. She was evaluated in ED and found to have low grade bowel obstruction on CT a/p. She was admitted with acute hypoxic respiratory failure d/t covid pna, BREANNA with creatinine 5.6, NA+ 165, lactic acidosis.     Her renal function worsened while here with worsening potassium. Her overall condition was d/w family, given her poor prognosis and unlikelihood to survive, they have opted for comfort measures.     She will be transferred to inpatient hospice today.        Vital Signs Last 24 Hrs    T(C): 36.8 (21 Apr 2020 22:24), Max: 36.8 (21 Apr 2020 22:24)    T(F): 98.2 (21 Apr 2020 22:24), Max: 98.2 (21 Apr 2020 22:24)    HR: 58 (22 Apr 2020 09:30) (58 - 109)    BP: 132/67 (22 Apr 2020 05:00) (132/67 - 137/60)    RR: 28 (22 Apr 2020 09:30) (18 - 28)    SpO2: 80% (22 Apr 2020 09:30) (80% - 100%)            PHYSICAL EXAM:    GENERAL: lethargic, intermittently flailing arms.     HEAD:  Normocephalic, atraumatic    EYES: EOMI, PERRLA    HEENT: Moist mucous membranes    NECK: Supple, No JVD    NERVOUS SYSTEM: lethargic, minimally arousable.    CHEST/LUNG: decrased bs b/l    HEART: Regular rate and rhythm    ABDOMEN: Soft, diffuse tenderness    GENITOURINARY: Voiding, no palpable bladder    EXTREMITIES:   No clubbing, cyanosis, or edema    MUSCULOSKELETAL- No muscle tenderness, no joint tenderness    SKIN-no rash        LABS:        04-21        161<HH>  |  136<H>  |  150<H>    ----------------------------<  188<H>    6.7<HH>   |  14<L>  |  9.12<H>        Ca    10.3<H>      21 Apr 2020 08:42    Phos  6.6     04-20    Mg     3.1     04-20         CT Abdomen and Pelvis No Cont (04.19.20 @ 17:03)     IMPRESSION:     1.  Unremarkable limited by lack of IV contrast, no CT evidence of bowel ischemia.    2.  Ventral abdominal wall hernia containing transverse colon with low-grade colonic obstruction due to the hernia.    3.  Ventral abdominal wall hernia also containing small bowel without evidence of obstruction.    4.  Bilateral COVID 19 pneumonia.        FINAL DIAGNOSIS:    1. Early bowel obstruction/Coffee ground emesis    2. Covid +pna/acute hypoxic resp failure/SEPSIS    3. Metabolic encephalopathy    4. BREANNA/Hyperkalemia/Hypernatremia    5. Dementia    6. HTN            time taken for dc 50 min    d/w MD @ [facility].    [place] to be contacted.

## 2020-04-22 NOTE — DISCHARGE NOTE NURSING/CASE MANAGEMENT/SOCIAL WORK - PATIENT PORTAL LINK FT
You can access the FollowMyHealth Patient Portal offered by [facility] by registering at the following website: [email or web address]. By joining [facility]’s FollowMyHealth portal, you will also be able to view your health information using other applications (apps) compatible with our system.

## 2020-04-23 LAB
A-TUMOR NECROSIS FACT SERPL-MCNC: <5 PG/ML — SIGNIFICANT CHANGE UP
IL10 SERPL-MCNC: 54 PG/ML — HIGH
IL12 SERPL-MCNC: <5 PG/ML — SIGNIFICANT CHANGE UP
IL13 SERPL-MCNC: <5 PG/ML — SIGNIFICANT CHANGE UP
IL2 SERPL-MCNC: 22 PG/ML — HIGH
IL2 SERPL-MCNC: 6190 PG/ML — HIGH
IL4 SERPL-MCNC: <5 PG/ML — SIGNIFICANT CHANGE UP
IL6 SERPL-MCNC: 66 PG/ML — HIGH
IL8 SERPL-MCNC: <5 PG/ML — SIGNIFICANT CHANGE UP
INTERFERON GAMMA: <5 PG/ML — SIGNIFICANT CHANGE UP
INTERLEUKIN 1 BETA: <5 PG/ML — SIGNIFICANT CHANGE UP
INTERLEUKIN 17: 10 PG/ML — SIGNIFICANT CHANGE UP
INTERLEUKIN 5: <5 PG/ML — SIGNIFICANT CHANGE UP

## 2020-04-24 DIAGNOSIS — E87.5 HYPERKALEMIA: ICD-10-CM

## 2020-04-24 DIAGNOSIS — E11.9 TYPE 2 DIABETES MELLITUS WITHOUT COMPLICATIONS: ICD-10-CM

## 2020-04-24 DIAGNOSIS — D64.9 ANEMIA, UNSPECIFIED: ICD-10-CM

## 2020-04-24 DIAGNOSIS — K43.6 OTHER AND UNSPECIFIED VENTRAL HERNIA WITH OBSTRUCTION, WITHOUT GANGRENE: ICD-10-CM

## 2020-04-24 DIAGNOSIS — K92.2 GASTROINTESTINAL HEMORRHAGE, UNSPECIFIED: ICD-10-CM

## 2020-04-24 DIAGNOSIS — A41.9 SEPSIS, UNSPECIFIED ORGANISM: ICD-10-CM

## 2020-04-24 DIAGNOSIS — N17.9 ACUTE KIDNEY FAILURE, UNSPECIFIED: ICD-10-CM

## 2020-04-24 DIAGNOSIS — F03.90 UNSPECIFIED DEMENTIA WITHOUT BEHAVIORAL DISTURBANCE: ICD-10-CM

## 2020-04-24 DIAGNOSIS — G93.41 METABOLIC ENCEPHALOPATHY: ICD-10-CM

## 2020-04-24 DIAGNOSIS — F32.9 MAJOR DEPRESSIVE DISORDER, SINGLE EPISODE, UNSPECIFIED: ICD-10-CM

## 2020-04-24 DIAGNOSIS — E87.0 HYPEROSMOLALITY AND HYPERNATREMIA: ICD-10-CM

## 2020-04-24 DIAGNOSIS — M19.90 UNSPECIFIED OSTEOARTHRITIS, UNSPECIFIED SITE: ICD-10-CM

## 2020-04-24 DIAGNOSIS — N18.9 CHRONIC KIDNEY DISEASE, UNSPECIFIED: ICD-10-CM

## 2020-04-24 DIAGNOSIS — Z66 DO NOT RESUSCITATE: ICD-10-CM

## 2020-04-24 DIAGNOSIS — U07.1 COVID-19: ICD-10-CM

## 2020-04-24 DIAGNOSIS — J12.89 OTHER VIRAL PNEUMONIA: ICD-10-CM

## 2020-04-24 DIAGNOSIS — J96.01 ACUTE RESPIRATORY FAILURE WITH HYPOXIA: ICD-10-CM

## 2020-04-24 DIAGNOSIS — E87.2 ACIDOSIS: ICD-10-CM

## 2020-04-24 LAB
CULTURE RESULTS: SIGNIFICANT CHANGE UP
CULTURE RESULTS: SIGNIFICANT CHANGE UP
SPECIMEN SOURCE: SIGNIFICANT CHANGE UP
SPECIMEN SOURCE: SIGNIFICANT CHANGE UP

## 2020-12-17 NOTE — ED ADULT NURSE NOTE - NS ED NURSE REPORT GIVEN DT
[Alert] : alert [Oriented x 3] : ~L oriented x 3 [Well Nourished] : well nourished [Conjunctiva Non-injected] : conjunctiva non-injected [No Visual Lymphadenopathy] : no visual  lymphadenopathy [No Clubbing] : no clubbing [No Edema] : no edema [No Bromhidrosis] : no bromhidrosis [No Chromhidrosis] : no chromhidrosis [FreeTextEntry3] : Exam notable for:\par \par 0.4X0.4 cm papule on right nasal ala with hemorrhagic crusting \par 0.3X0.3 cm crusted papule with multiple telangiectasias on left cheek\par 0.5X0.5 crusted nodule on right right leg \par Numerous pink scaly papules scattered over cheeks and nose\par Right popliteal fossa with minimal protrusion of blue suture 19-Apr-2020 20:04

## 2021-06-30 NOTE — CONSULT NOTE ADULT - SUBJECTIVE AND OBJECTIVE BOX
Behavioral Health Note:  REASON FOR ADMISSION:   Failure to thrive    REASON FOR CONSULT:  Psychiatry consultation requested for evaluation and advice d/t confusion, dementia, refusal of PO intake, depression    OBJECTIVE:  Daisy Snellen is a  80year old needs complete assistance with bathing and cannot complete other ADLs related to food preparation.      Cite Alejo Hollingsworth reports that over the past two weeks her mother has experienced early awakening, she goes to bed between 8-9pm and wakes up between 2-330am, will br ASSESSMENT  Per Richar Menchaca has no hx SI/HI/SIB, CSSR=0, INDICATING LOW RISK    ASSESSMENT  Sophie Thomas has a dx r/o unspecified dementia, r/o unspecified depressive disorder, r/o unspecified bereavement    PLAN  1. Medication management by Dr. Panda Bassett  2.  R HPI:  87 year old female patient with pertinent history of Dementia  and c k d who presented with coffee ground emesis and increasing lethargy  worsening renal function  covid pneumonia and altered mental status    demented , no other information could be obtained.      PAST MEDICAL & SURGICAL HISTORY:  DM  CKD    Allergies    Allergy Status Unknown    Intolerances        MEDICATIONS  (STANDING):  amLODIPine   Tablet 10 milliGRAM(s) Oral daily  ascorbic acid 500 milliGRAM(s) Oral daily  dextrose 5% + sodium chloride 0.45%. 1000 milliLiter(s) (500 mL/Hr) IV Continuous <Continuous>  dextrose 5% + sodium chloride 0.45%. 1000 milliLiter(s) (100 mL/Hr) IV Continuous <Continuous>  donepezil 10 milliGRAM(s) Oral at bedtime  DULoxetine 60 milliGRAM(s) Oral at bedtime  DULoxetine 30 milliGRAM(s) Oral daily  folic acid 1 milliGRAM(s) Oral daily  gabapentin 300 milliGRAM(s) Oral three times a day  guaifenesin/dextromethorphan  Syrup 10 milliLiter(s) Oral every 4 hours  hydrALAZINE 25 milliGRAM(s) Oral two times a day  hydroxychloroquine   Oral   hydroxychloroquine 400 milliGRAM(s) Oral every 24 hours  memantine 10 milliGRAM(s) Oral two times a day  oxyCODONE    IR 5 milliGRAM(s) Oral every 6 hours  pantoprazole    Tablet 40 milliGRAM(s) Oral two times a day  piperacillin/tazobactam IVPB.. 3.375 Gram(s) IV Intermittent every 12 hours    MEDICATIONS  (PRN):  acetaminophen   Tablet .. 650 milliGRAM(s) Oral every 4 hours PRN Temp greater or equal to 38.5C (101.3F)  morphine  - Injectable 1 milliGRAM(s) IV Push every 3 hours PRN Severe Pain (7 - 10)  ondansetron Injectable 4 milliGRAM(s) IV Push every 6 hours PRN Nausea and/or Vomiting      FAMILY HISTORY:    could not obtain  Social History:  could not obtain   REVIEW OF SYSTEMS      could not obtain secondary to mental status    PHYSICAL EXAM:    Vital Signs Last 24 Hrs  T(C): 36.5 (20 Apr 2020 10:24), Max: 37.4 (19 Apr 2020 17:10)  T(F): 97.7 (20 Apr 2020 10:24), Max: 99.3 (19 Apr 2020 17:10)  HR: 99 (20 Apr 2020 10:24) (97 - 111)  BP: 100/50 (20 Apr 2020 10:24) (86/36 - 110/52)  BP(mean): --  RR: 18 (20 Apr 2020 10:24) (18 - 28)  SpO2: 95% (20 Apr 2020 10:24) (95% - 100%)    Constitutional: no acute distress    ENMT: nc/at    Neck: supple.     Respiratory: b/l rhonchi    Cardiovascular: RRR s1s2     Gastrointestinal: Pos bs , soft ,  nontender    Rectal: not performed       Extremities: NO cce     Neurological: confused    Skin: No rash or jaundice     Date/Time:04-20 @ 12:16    Albumin: --  ALT/SGPT: --  Alk Phos: --  AST/SGOT: --  Bilirubin Direct: --  Bilirubin Total: --  Ca: 9.6  eGFR : 5  eGFR Non-: 5  Lipase: --  Amylase: --  INR: --  PTT: --      04-20    163<HH>  |  137<H>  |  133<H>  ----------------------------<  210<H>  6.1<H>   |  17<L>  |  7.29<H>    Ca    9.6      20 Apr 2020 12:16  Phos  6.6     04-20  Mg     3.1     04-20    TPro  8.8<H>  /  Alb  2.1<L>  /  TBili  1.1  /  DBili  x   /  AST  29  /  ALT  34  /  AlkPhos  141<H>  04-19                            9.4    14.99 )-----------( 206      ( 20 Apr 2020 06:39 )             31.8   < from: CT Abdomen and Pelvis No Cont (04.19.20 @ 17:03) >    EXAM:  CT ABDOMEN AND PELVIS                            PROCEDURE DATE:  04/19/2020          INTERPRETATION:  CLINICAL INFORMATION: GI bleed, bloody stools.  COVID19 BICC status: POSITIVE as of 2020-04-19 02:41 pm    COMPARISON: June 24, 2016    PROCEDURE:   CT of the Abdomen and Pelvis was performed without intravenous contrast.   Intravenous contrast: None.  Oral contrast: None.  Sagittal and coronal reformats were performed.    FINDINGS:    LOWER CHEST: Groundglass opacity within the lingulaand bilateral lower lobes. Findings are consistent with known COVID pneumonia.    LIVER: Within normal limits.  BILE DUCTS: Normal caliber.  GALLBLADDER: Within normal limits.  SPLEEN: Within normal limits.  PANCREAS: Within normal limits.  ADRENALS:Low density 1.1 cm RIGHT adrenal nodule consistent with adenoma.  KIDNEYS/URETERS: Multiple LEFT renal cysts which are stable.  RIGHT kidney unremarkable without hydroureter.  BLADDER: Decompressed bladder with Enriquez catheter.  REPRODUCTIVE ORGANS: Hysterectomy.    BOWEL/ABDOMINAL WALL: Large ventral abdominal wall hernia containing both large and small bowel. There is slight dilatation of the colonic loop within the hernia suggesting low-grade functional obstruction. There is also slight dilatation of the upstream RIGHT colon. Appendix is normal.  PERITONEUM: No ascites.  VESSELS: Limited by lack of IV contrast material.  RETROPERITONEUM/LYMPH NODES: No lymphadenopathy.      BONES: Stable wedge compression deformity of L1 and L3. Extensive degenerative changes related to the RIGHT hip joint. RIGHT acetabular protrusio.    IMPRESSION:   1.  Unremarkable limited by lack of IV contrast, no CT evidence of bowel ischemia.  2.  Ventral abdominal wall hernia containing transverse colon with low-grade colonic obstruction due to the hernia.  3.  Ventral abdominal wall hernia also containing small bowel without evidence of obstruction.  4.  Bilateral COVID 19 pneumonia.                        VELIA MALDONADO   This document has been electronically signed. Apr 19 2020  5:27PM        < end of copied text >

## 2023-08-20 NOTE — PROVIDER CONTACT NOTE (CRITICAL VALUE NOTIFICATION) - NAME OF MD/NP/PA/DO NOTIFIED:
RN Assessment:    Pt declined the use of an interpretor for RN assessment.    Pt presented with flat affect. Pt appeared calm, and pt was cooperative while interacting with the writer. Pt was alert and oriented x 4. Pt denied having SI, HI, and thoughts of SIB. Pt appeared to be responding to internal stimuli. Pt was observed talking and laughing to self multiple times this shift. Pt denied having physical pain. Pt had no new medical concerns. Pt endorsed sleeping well last night. Pt feels the medications that are currently ordered are working well. No medication side effects endorsed by pt or observed by writer. Pt was isolative and withdrawn this shift. Continue to monitor for safety and changes in medical condition.     Pt continues on SIO.     Dr. Serrano